# Patient Record
Sex: MALE | Race: WHITE
[De-identification: names, ages, dates, MRNs, and addresses within clinical notes are randomized per-mention and may not be internally consistent; named-entity substitution may affect disease eponyms.]

---
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
## 2023-04-18 NOTE — REP
Clinical:  Trauma.  Dislocation.

 

Technique:  Two neutral views of the right shoulder.

 

Findings:

There is anteroinferior dislocation of the humeral head in relation to the

glenoid.  There is a suspected vague fracture fragment possibly from the humeral

head itself which overlies the inferior portion of the glenoid rim.  The

acromioclavicular joint appears intact.  The surrounding soft tissues are grossly

unremarkable.

 

Impression:

Anteroinferior glenohumeral joint dislocation with small suspected fracture

fragment possibly from the humeral head.

 

 

Electronically Signed by

Diallo Gold MD 06/25/2019 06:03 A Per order of Deedee Murray MD a post void residual was done via Bladder scanner.  PVR was 123 ml.